# Patient Record
Sex: FEMALE | URBAN - METROPOLITAN AREA
[De-identification: names, ages, dates, MRNs, and addresses within clinical notes are randomized per-mention and may not be internally consistent; named-entity substitution may affect disease eponyms.]

---

## 2022-06-04 ENCOUNTER — TELEPHONE ENCOUNTER (OUTPATIENT)
Dept: URBAN - METROPOLITAN AREA CLINIC 68 | Facility: CLINIC | Age: 87
End: 2022-06-04

## 2022-06-05 ENCOUNTER — TELEPHONE ENCOUNTER (OUTPATIENT)
Dept: URBAN - METROPOLITAN AREA CLINIC 68 | Facility: CLINIC | Age: 87
End: 2022-06-05

## 2022-06-25 ENCOUNTER — TELEPHONE ENCOUNTER (OUTPATIENT)
Age: 87
End: 2022-06-25

## 2022-06-26 ENCOUNTER — TELEPHONE ENCOUNTER (OUTPATIENT)
Age: 87
End: 2022-06-26

## 2023-03-22 DIAGNOSIS — M80.00XS AGE-RELATED OSTEOPOROSIS WITH CURRENT PATHOLOGICAL FRACTURE, SEQUELA: Primary | ICD-10-CM

## 2023-03-22 RX ORDER — OMEPRAZOLE 40 MG/1
1 CAPSULE, DELAYED RELEASE ORAL DAILY
COMMUNITY
Start: 2021-11-01

## 2023-03-22 RX ORDER — SOLIFENACIN SUCCINATE 10 MG/1
1 TABLET, FILM COATED ORAL DAILY
COMMUNITY
Start: 2022-03-17

## 2023-03-22 RX ORDER — IBANDRONATE SODIUM 150 MG/1
150 TABLET, FILM COATED ORAL
COMMUNITY
Start: 2018-12-05 | End: 2023-03-22 | Stop reason: SDUPTHER

## 2023-03-22 RX ORDER — ALBUTEROL SULFATE 90 UG/1
1 AEROSOL, METERED RESPIRATORY (INHALATION)
COMMUNITY
Start: 2018-04-23

## 2023-03-22 RX ORDER — LORAZEPAM 0.5 MG/1
0.5 TABLET ORAL EVERY 6 HOURS PRN
COMMUNITY
Start: 2015-04-01 | End: 2023-04-24 | Stop reason: SDUPTHER

## 2023-03-22 RX ORDER — FLUTICASONE PROPIONATE AND SALMETEROL 250; 50 UG/1; UG/1
1 POWDER RESPIRATORY (INHALATION) 2 TIMES DAILY
COMMUNITY
Start: 2022-02-02

## 2023-03-22 RX ORDER — DIAPER,BRIEF,INFANT-TODD,DISP
1 EACH MISCELLANEOUS DAILY
COMMUNITY

## 2023-03-22 RX ORDER — LEVOTHYROXINE SODIUM 25 UG/1
1 TABLET ORAL DAILY
COMMUNITY
Start: 2021-03-18 | End: 2023-04-05 | Stop reason: SDUPTHER

## 2023-03-22 RX ORDER — MOMETASONE FUROATE AND FORMOTEROL FUMARATE DIHYDRATE 50; 5 UG/1; UG/1
1 AEROSOL RESPIRATORY (INHALATION) DAILY
COMMUNITY
Start: 2021-07-29

## 2023-03-22 RX ORDER — MULTIVIT,IRON,MINERALS/LUTEIN
1 TABLET ORAL DAILY
COMMUNITY

## 2023-03-22 RX ORDER — ELECTROLYTES/DEXTROSE
1 SOLUTION, ORAL ORAL DAILY
COMMUNITY

## 2023-03-22 RX ORDER — ZOLPIDEM TARTRATE 10 MG/1
1 TABLET ORAL NIGHTLY
COMMUNITY
Start: 2018-06-11 | End: 2023-04-05 | Stop reason: SDUPTHER

## 2023-03-22 RX ORDER — IBANDRONATE SODIUM 150 MG/1
150 TABLET, FILM COATED ORAL
Qty: 3 TABLET | Refills: 3 | Status: SHIPPED | OUTPATIENT
Start: 2023-03-22 | End: 2023-04-04 | Stop reason: ALTCHOICE

## 2023-04-04 ENCOUNTER — OFFICE VISIT (OUTPATIENT)
Dept: PRIMARY CARE | Facility: CLINIC | Age: 88
End: 2023-04-04
Payer: MEDICARE

## 2023-04-04 VITALS — DIASTOLIC BLOOD PRESSURE: 68 MMHG | HEART RATE: 72 BPM | OXYGEN SATURATION: 96 % | SYSTOLIC BLOOD PRESSURE: 120 MMHG

## 2023-04-04 DIAGNOSIS — E03.8 HYPOTHYROIDISM DUE TO HASHIMOTO'S THYROIDITIS: ICD-10-CM

## 2023-04-04 DIAGNOSIS — E06.3 HYPOTHYROIDISM DUE TO HASHIMOTO'S THYROIDITIS: ICD-10-CM

## 2023-04-04 DIAGNOSIS — J45.20 MILD INTERMITTENT ASTHMA WITHOUT COMPLICATION (HHS-HCC): Primary | ICD-10-CM

## 2023-04-04 DIAGNOSIS — M81.0 POSTMENOPAUSAL OSTEOPOROSIS: ICD-10-CM

## 2023-04-04 PROBLEM — E87.1 HYPONATREMIA: Status: ACTIVE | Noted: 2023-04-04

## 2023-04-04 PROBLEM — N32.89 BLADDER SPASMS: Status: ACTIVE | Noted: 2023-04-04

## 2023-04-04 PROBLEM — I95.1 ORTHOSTATIC HYPOTENSION: Status: ACTIVE | Noted: 2023-04-04

## 2023-04-04 PROBLEM — E78.5 HYPERLIPIDEMIA: Status: ACTIVE | Noted: 2023-04-04

## 2023-04-04 PROBLEM — H91.10 HEARING LOSS OF AGING: Status: ACTIVE | Noted: 2023-04-04

## 2023-04-04 PROBLEM — R31.29 OTHER MICROSCOPIC HEMATURIA: Status: ACTIVE | Noted: 2023-04-04

## 2023-04-04 PROBLEM — G47.00 INSOMNIA: Status: ACTIVE | Noted: 2023-04-04

## 2023-04-04 PROBLEM — G45.9 TIA (TRANSIENT ISCHEMIC ATTACK): Status: ACTIVE | Noted: 2023-04-04

## 2023-04-04 PROBLEM — R01.1 SYSTOLIC MURMUR: Status: ACTIVE | Noted: 2023-04-04

## 2023-04-04 PROBLEM — I49.1 ATRIAL PREMATURE COMPLEX: Status: ACTIVE | Noted: 2023-04-04

## 2023-04-04 PROBLEM — R05.8 UPPER AIRWAY COUGH SYNDROME: Status: ACTIVE | Noted: 2023-04-04

## 2023-04-04 PROBLEM — R68.2 DRY MOUTH: Status: ACTIVE | Noted: 2023-04-04

## 2023-04-04 PROBLEM — M76.70 PERONEAL TENDINITIS: Status: ACTIVE | Noted: 2023-04-04

## 2023-04-04 PROBLEM — M17.12 ARTHRITIS OF KNEE, LEFT: Status: ACTIVE | Noted: 2023-04-04

## 2023-04-04 PROBLEM — M25.511 CHRONIC RIGHT SHOULDER PAIN: Status: ACTIVE | Noted: 2023-04-04

## 2023-04-04 PROBLEM — D75.1 POLYCYTHEMIA: Status: ACTIVE | Noted: 2023-04-04

## 2023-04-04 PROBLEM — S92.353A FRACTURE OF FIFTH METATARSAL BONE: Status: ACTIVE | Noted: 2023-04-04

## 2023-04-04 PROBLEM — H90.3 BILATERAL SENSORINEURAL HEARING LOSS: Status: ACTIVE | Noted: 2023-04-04

## 2023-04-04 PROBLEM — J30.9 RHINITIS, ALLERGIC: Status: ACTIVE | Noted: 2023-04-04

## 2023-04-04 PROBLEM — R05.9 COUGH: Status: ACTIVE | Noted: 2023-04-04

## 2023-04-04 PROBLEM — R51.9 HEADACHE: Status: ACTIVE | Noted: 2023-04-04

## 2023-04-04 PROBLEM — M54.6 THORACIC SPINE PAIN: Status: ACTIVE | Noted: 2023-04-04

## 2023-04-04 PROBLEM — G47.34 NOCTURNAL HYPOXIA: Status: ACTIVE | Noted: 2023-04-04

## 2023-04-04 PROBLEM — R07.9 CHEST PAIN: Status: ACTIVE | Noted: 2023-04-04

## 2023-04-04 PROBLEM — S93.402A SPRAIN OF LEFT ANKLE: Status: ACTIVE | Noted: 2023-04-04

## 2023-04-04 PROBLEM — D86.9 SARCOID: Status: ACTIVE | Noted: 2023-04-04

## 2023-04-04 PROBLEM — R49.0 HOARSE VOICE QUALITY: Status: ACTIVE | Noted: 2023-04-04

## 2023-04-04 PROBLEM — M54.50 LOW BACK PAIN: Status: ACTIVE | Noted: 2023-04-04

## 2023-04-04 PROBLEM — R49.0 MUSCLE TENSION DYSPHONIA: Status: ACTIVE | Noted: 2023-04-04

## 2023-04-04 PROBLEM — U07.1 COVID-19: Status: ACTIVE | Noted: 2023-04-04

## 2023-04-04 PROBLEM — R39.15 URGENCY OF URINATION: Status: ACTIVE | Noted: 2023-04-04

## 2023-04-04 PROBLEM — E03.9 ACQUIRED HYPOTHYROIDISM: Status: ACTIVE | Noted: 2023-04-04

## 2023-04-04 PROBLEM — R06.09 EXERTIONAL DYSPNEA: Status: ACTIVE | Noted: 2023-04-04

## 2023-04-04 PROBLEM — M53.86 SCIATICA OF LEFT SIDE ASSOCIATED WITH DISORDER OF LUMBAR SPINE: Status: ACTIVE | Noted: 2023-04-04

## 2023-04-04 PROBLEM — R00.2 PALPITATIONS: Status: ACTIVE | Noted: 2023-04-04

## 2023-04-04 PROBLEM — R19.7 DIARRHEA: Status: ACTIVE | Noted: 2023-04-04

## 2023-04-04 PROBLEM — I87.1 IVC OBSTRUCTION: Status: ACTIVE | Noted: 2023-04-04

## 2023-04-04 PROBLEM — J30.0 VASOMOTOR RHINITIS: Status: ACTIVE | Noted: 2023-04-04

## 2023-04-04 PROBLEM — F41.9 ANXIETY: Status: ACTIVE | Noted: 2023-04-04

## 2023-04-04 PROBLEM — R11.0 NAUSEA: Status: ACTIVE | Noted: 2023-04-04

## 2023-04-04 PROBLEM — J38.02 BILATERAL VOCAL CORD PARESIS: Status: ACTIVE | Noted: 2023-04-04

## 2023-04-04 PROBLEM — F40.240 CLAUSTROPHOBIA: Status: ACTIVE | Noted: 2023-04-04

## 2023-04-04 PROBLEM — N13.30 HYDRONEPHROSIS, RIGHT: Status: ACTIVE | Noted: 2023-04-04

## 2023-04-04 PROBLEM — N94.89 LABIAL PAIN: Status: ACTIVE | Noted: 2023-04-04

## 2023-04-04 PROBLEM — M17.11 ARTHRITIS OF RIGHT KNEE: Status: ACTIVE | Noted: 2023-04-04

## 2023-04-04 PROBLEM — J18.9 PNEUMONIA OF LEFT LOWER LOBE DUE TO INFECTIOUS ORGANISM: Status: ACTIVE | Noted: 2023-04-04

## 2023-04-04 PROBLEM — G89.29 CHRONIC RIGHT SHOULDER PAIN: Status: ACTIVE | Noted: 2023-04-04

## 2023-04-04 PROBLEM — R09.89 CHEST CONGESTION: Status: ACTIVE | Noted: 2023-04-04

## 2023-04-04 PROBLEM — R11.2 NAUSEA WITH VOMITING: Status: ACTIVE | Noted: 2023-04-04

## 2023-04-04 PROBLEM — K55.9 ISCHEMIC COLITIS (MULTI): Status: ACTIVE | Noted: 2023-04-04

## 2023-04-04 PROBLEM — I73.00 RAYNAUD'S DISEASE WITHOUT GANGRENE: Status: ACTIVE | Noted: 2023-04-04

## 2023-04-04 PROBLEM — H81.11 BENIGN PAROXYSMAL POSITIONAL VERTIGO OF RIGHT EAR: Status: ACTIVE | Noted: 2023-04-04

## 2023-04-04 PROBLEM — J38.3 GLOTTIC INSUFFICIENCY: Status: ACTIVE | Noted: 2023-04-04

## 2023-04-04 PROBLEM — I87.2 VENOUS INSUFFICIENCY: Status: ACTIVE | Noted: 2023-04-04

## 2023-04-04 PROBLEM — R53.83 FATIGUE: Status: ACTIVE | Noted: 2023-04-04

## 2023-04-04 PROBLEM — R63.4 ABNORMAL WEIGHT LOSS: Status: ACTIVE | Noted: 2023-04-04

## 2023-04-04 PROBLEM — M54.31 SCIATICA OF RIGHT SIDE: Status: ACTIVE | Noted: 2023-04-04

## 2023-04-04 PROBLEM — S99.912A INJURY OF LEFT ANKLE AND FOOT: Status: ACTIVE | Noted: 2023-04-04

## 2023-04-04 PROBLEM — H93.19 TINNITUS, SUBJECTIVE: Status: ACTIVE | Noted: 2023-04-04

## 2023-04-04 PROBLEM — N90.89 VULVAR IRRITATION: Status: ACTIVE | Noted: 2023-04-04

## 2023-04-04 PROBLEM — R13.10 ODYNOPHAGIA: Status: ACTIVE | Noted: 2023-04-04

## 2023-04-04 PROBLEM — I49.3 PREMATURE VENTRICULAR CONTRACTIONS: Status: ACTIVE | Noted: 2023-04-04

## 2023-04-04 PROBLEM — R11.15 NON-INTRACTABLE CYCLICAL VOMITING WITH NAUSEA: Status: ACTIVE | Noted: 2023-04-04

## 2023-04-04 PROBLEM — R07.0 LARYNGEAL PAIN: Status: ACTIVE | Noted: 2023-04-04

## 2023-04-04 PROBLEM — S90.30XA FOOT CONTUSION: Status: ACTIVE | Noted: 2023-04-04

## 2023-04-04 PROBLEM — F41.8 SITUATIONAL ANXIETY: Status: ACTIVE | Noted: 2023-04-04

## 2023-04-04 PROBLEM — M67.40 GANGLION OF JOINT: Status: ACTIVE | Noted: 2023-04-04

## 2023-04-04 PROBLEM — K21.9 ESOPHAGEAL REFLUX: Status: ACTIVE | Noted: 2023-04-04

## 2023-04-04 PROBLEM — R07.89 CHEST PRESSURE: Status: ACTIVE | Noted: 2023-04-04

## 2023-04-04 PROBLEM — N90.7 LABIAL CYST: Status: ACTIVE | Noted: 2023-04-04

## 2023-04-04 PROBLEM — S99.929A FOOT INJURY: Status: ACTIVE | Noted: 2023-04-04

## 2023-04-04 PROBLEM — S99.922A INJURY OF LEFT ANKLE AND FOOT: Status: ACTIVE | Noted: 2023-04-04

## 2023-04-04 PROBLEM — T50.905A ADVERSE DRUG REACTION: Status: ACTIVE | Noted: 2023-04-04

## 2023-04-04 PROCEDURE — 1159F MED LIST DOCD IN RCRD: CPT | Performed by: INTERNAL MEDICINE

## 2023-04-04 PROCEDURE — 99214 OFFICE O/P EST MOD 30 MIN: CPT | Performed by: INTERNAL MEDICINE

## 2023-04-04 RX ORDER — IPRATROPIUM BROMIDE 42 UG/1
2 SPRAY, METERED NASAL SEE ADMIN INSTRUCTIONS
Qty: 15 ML | Refills: 11 | Status: SHIPPED | OUTPATIENT
Start: 2023-04-04 | End: 2023-05-01

## 2023-04-04 RX ORDER — IPRATROPIUM BROMIDE 42 UG/1
2 SPRAY, METERED NASAL SEE ADMIN INSTRUCTIONS
COMMUNITY
Start: 2023-02-12 | End: 2023-04-04 | Stop reason: SDUPTHER

## 2023-04-04 ASSESSMENT — ENCOUNTER SYMPTOMS
LOSS OF SENSATION IN FEET: 0
OCCASIONAL FEELINGS OF UNSTEADINESS: 0
DEPRESSION: 0

## 2023-04-04 NOTE — PROGRESS NOTES
Subjective   Patient ID: Kaylen Landers is a 89 y.o. female who presents for Cerumen Impaction (Discuss medications).    Patient is here concerned about congestion in her left ear.  She went to her audiologist who felt that she had wax in her ear.  She is doing fairly well after the loss of her .  She seems to be somewhat tearful but does maintain a fairly active social life and has a fairly good network of friends.  All of her family is on the \[facility]\"" and she is actually planning to go out to visit them.  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  There is no further treatment recommended at this point however.    Her exam reveals wax in the left ear which was lavaged clear without difficulty her TM looks fine beneath.  Patient will continue her current treatment program I refilled her ipratropium bromide I also switch her over to Prolia injections as she was having a lot of difficulty tolerating the ibandronate and she does have meaningful quality of life left at this point.         Review of Systems    Objective   /68   Pulse 72   SpO2 96%     Physical Exam    Assessment/Plan

## 2023-04-05 DIAGNOSIS — F41.9 ANXIETY: Primary | ICD-10-CM

## 2023-04-05 DIAGNOSIS — F32.A DEPRESSION, UNSPECIFIED DEPRESSION TYPE: ICD-10-CM

## 2023-04-05 DIAGNOSIS — G47.00 INSOMNIA, UNSPECIFIED TYPE: ICD-10-CM

## 2023-04-05 DIAGNOSIS — E03.9 ACQUIRED HYPOTHYROIDISM: Primary | ICD-10-CM

## 2023-04-05 RX ORDER — TRAZODONE HYDROCHLORIDE 50 MG/1
50 TABLET ORAL NIGHTLY PRN
Qty: 30 TABLET | Refills: 11 | Status: SHIPPED | OUTPATIENT
Start: 2023-04-05 | End: 2023-04-24 | Stop reason: SDUPTHER

## 2023-04-05 RX ORDER — DULOXETIN HYDROCHLORIDE 30 MG/1
30 CAPSULE, DELAYED RELEASE ORAL DAILY
COMMUNITY
Start: 2018-11-18 | End: 2023-04-05 | Stop reason: SDUPTHER

## 2023-04-05 RX ORDER — ZOLPIDEM TARTRATE 10 MG/1
10 TABLET ORAL NIGHTLY
Qty: 30 TABLET | Refills: 0 | Status: SHIPPED | OUTPATIENT
Start: 2023-04-05 | End: 2023-04-24 | Stop reason: SDUPTHER

## 2023-04-05 RX ORDER — LEVOTHYROXINE SODIUM 25 UG/1
25 TABLET ORAL DAILY
Qty: 90 TABLET | Refills: 1 | Status: SHIPPED | OUTPATIENT
Start: 2023-04-05 | End: 2023-04-25 | Stop reason: SDUPTHER

## 2023-04-05 RX ORDER — DULOXETIN HYDROCHLORIDE 30 MG/1
30 CAPSULE, DELAYED RELEASE ORAL DAILY
Qty: 90 CAPSULE | Refills: 0 | Status: SHIPPED | OUTPATIENT
Start: 2023-04-05

## 2023-04-19 DIAGNOSIS — M80.00XD AGE-RELATED OSTEOPOROSIS WITH CURRENT PATHOLOGICAL FRACTURE WITH ROUTINE HEALING, SUBSEQUENT ENCOUNTER: Primary | ICD-10-CM

## 2023-04-19 DIAGNOSIS — M81.0 AGE RELATED OSTEOPOROSIS, UNSPECIFIED PATHOLOGICAL FRACTURE PRESENCE: ICD-10-CM

## 2023-04-19 DIAGNOSIS — F41.9 ANXIETY: ICD-10-CM

## 2023-04-19 DIAGNOSIS — J45.20 MILD INTERMITTENT ASTHMA, UNSPECIFIED WHETHER COMPLICATED (HHS-HCC): ICD-10-CM

## 2023-04-19 DIAGNOSIS — E03.9 ACQUIRED HYPOTHYROIDISM: ICD-10-CM

## 2023-04-19 DIAGNOSIS — G47.00 INSOMNIA, UNSPECIFIED TYPE: ICD-10-CM

## 2023-04-19 DIAGNOSIS — F32.A DEPRESSION, UNSPECIFIED DEPRESSION TYPE: ICD-10-CM

## 2023-04-23 DIAGNOSIS — R39.15 URGENCY OF URINATION: Primary | ICD-10-CM

## 2023-04-23 DIAGNOSIS — K21.9 GASTROESOPHAGEAL REFLUX DISEASE WITHOUT ESOPHAGITIS: ICD-10-CM

## 2023-04-24 RX ORDER — LORAZEPAM 0.5 MG/1
0.5 TABLET ORAL EVERY 6 HOURS PRN
Qty: 30 TABLET | Refills: 0 | Status: SHIPPED | OUTPATIENT
Start: 2023-04-24

## 2023-04-24 RX ORDER — ONDANSETRON 8 MG/1
TABLET, ORALLY DISINTEGRATING ORAL
Qty: 30 TABLET | Refills: 1 | Status: SHIPPED | OUTPATIENT
Start: 2023-04-24

## 2023-04-24 RX ORDER — ZOLPIDEM TARTRATE 10 MG/1
10 TABLET ORAL NIGHTLY
Qty: 30 TABLET | Refills: 0 | Status: SHIPPED | OUTPATIENT
Start: 2023-04-24 | End: 2023-06-02 | Stop reason: SDUPTHER

## 2023-04-24 RX ORDER — MIRABEGRON 25 MG/1
TABLET, FILM COATED, EXTENDED RELEASE ORAL
Qty: 30 TABLET | Refills: 11 | Status: SHIPPED | OUTPATIENT
Start: 2023-04-24

## 2023-04-24 RX ORDER — TRAZODONE HYDROCHLORIDE 50 MG/1
50 TABLET ORAL NIGHTLY PRN
Qty: 30 TABLET | Refills: 11 | Status: SHIPPED | OUTPATIENT
Start: 2023-04-24 | End: 2024-04-23

## 2023-04-25 RX ORDER — LEVOTHYROXINE SODIUM 25 UG/1
25 TABLET ORAL DAILY
Qty: 90 TABLET | Refills: 1 | Status: SHIPPED | OUTPATIENT
Start: 2023-04-25 | End: 2023-04-25 | Stop reason: SDUPTHER

## 2023-04-25 RX ORDER — PREDNISONE 5 MG/1
5 TABLET ORAL DAILY
Qty: 21 TABLET | Refills: 17 | Status: SHIPPED | OUTPATIENT
Start: 2023-04-25 | End: 2024-04-24

## 2023-04-25 RX ORDER — LEVOTHYROXINE SODIUM 25 UG/1
25 TABLET ORAL DAILY
Qty: 90 TABLET | Refills: 1 | Status: SHIPPED | OUTPATIENT
Start: 2023-04-25 | End: 2023-07-07 | Stop reason: SDUPTHER

## 2023-04-29 DIAGNOSIS — J45.20 MILD INTERMITTENT ASTHMA WITHOUT COMPLICATION (HHS-HCC): ICD-10-CM

## 2023-05-01 RX ORDER — IPRATROPIUM BROMIDE 42 UG/1
SPRAY, METERED NASAL
Qty: 45 ML | Refills: 3 | Status: SHIPPED | OUTPATIENT
Start: 2023-05-01

## 2023-06-02 DIAGNOSIS — G47.00 INSOMNIA, UNSPECIFIED TYPE: ICD-10-CM

## 2023-06-02 DIAGNOSIS — F32.A DEPRESSION, UNSPECIFIED DEPRESSION TYPE: ICD-10-CM

## 2023-06-05 RX ORDER — ZOLPIDEM TARTRATE 10 MG/1
10 TABLET ORAL NIGHTLY
Qty: 30 TABLET | Refills: 0 | Status: SHIPPED | OUTPATIENT
Start: 2023-06-05

## 2023-07-07 DIAGNOSIS — E03.9 ACQUIRED HYPOTHYROIDISM: ICD-10-CM

## 2023-07-07 RX ORDER — LEVOTHYROXINE SODIUM 25 UG/1
25 TABLET ORAL DAILY
Qty: 90 TABLET | Refills: 1 | Status: SHIPPED | OUTPATIENT
Start: 2023-07-07

## 2023-07-13 ENCOUNTER — OFFICE VISIT (OUTPATIENT)
Dept: PRIMARY CARE | Facility: CLINIC | Age: 88
End: 2023-07-13
Payer: MEDICARE

## 2023-07-13 VITALS — OXYGEN SATURATION: 98 % | DIASTOLIC BLOOD PRESSURE: 62 MMHG | HEART RATE: 58 BPM | SYSTOLIC BLOOD PRESSURE: 120 MMHG

## 2023-07-13 DIAGNOSIS — G47.34 NOCTURNAL HYPOXIA: ICD-10-CM

## 2023-07-13 DIAGNOSIS — I87.1 IVC OBSTRUCTION: ICD-10-CM

## 2023-07-13 DIAGNOSIS — M81.0 POSTMENOPAUSAL OSTEOPOROSIS: ICD-10-CM

## 2023-07-13 DIAGNOSIS — J45.20 MILD INTERMITTENT ASTHMA, UNSPECIFIED WHETHER COMPLICATED (HHS-HCC): Primary | ICD-10-CM

## 2023-07-13 PROCEDURE — 1126F AMNT PAIN NOTED NONE PRSNT: CPT | Performed by: INTERNAL MEDICINE

## 2023-07-13 PROCEDURE — 99213 OFFICE O/P EST LOW 20 MIN: CPT | Performed by: INTERNAL MEDICINE

## 2023-07-13 PROCEDURE — 1159F MED LIST DOCD IN RCRD: CPT | Performed by: INTERNAL MEDICINE

## 2023-07-13 NOTE — PROGRESS NOTES
Subjective   Patient ID: Kaylen Landers is a 89 y.o. female who presents for No chief complaint on file..    Patient is here just prior to going to [place] where she will be living in an independent living facility near her children.  Today she has a couple of complaints.  #1 she complains of some mild pain up under her left ribs.  This is intermittent and fairly mild she just took 1 tramadol the other day for the first time.  She also has a little bit of discomfort in her left lower quadrant.  She has no generalized pain and otherwise feels quite well.  She wondered about using her CPAP machine as she moved to [place] she has not been using it at all and feels fairly well without it she was unable to tolerate it when she was using it and it did not seem to help her at that time either.  She wonders about continuing her bisphosphonate for her osteoporosis.         Review of Systems    Objective   /62   Pulse 58   SpO2 98%     Physical Exam  Abdominal:      General: Abdomen is flat.      Palpations: Abdomen is soft. There is no mass.      Tenderness: There is abdominal tenderness.      Comments: Minimal left lower quadrant tenderness and left upper quadrant tenderness.         Assessment/Plan   Problem List Items Addressed This Visit       Asthma, intermittent - Primary    IVC obstruction    Nocturnal hypoxia     Patient has not been using her CPAP and did not feel it helped her anyway.  I recommended she discontinue it at this point.         Postmenopausal osteoporosis     Given her advanced metastatic cancer I recommend she discontinue her bisphosphonate at this point.          We reviewed her medication list and printed out a accurate most recent med list for her that she can take with her to her new location.  She is going to be seeing a new physician there prior to moving into her independent living facility.

## 2023-07-13 NOTE — ASSESSMENT & PLAN NOTE
Given her advanced metastatic cancer I recommend she discontinue her bisphosphonate at this point.

## 2023-07-13 NOTE — ASSESSMENT & PLAN NOTE
Patient has not been using her CPAP and did not feel it helped her anyway.  I recommended she discontinue it at this point.